# Patient Record
Sex: FEMALE | Race: AMERICAN INDIAN OR ALASKA NATIVE
[De-identification: names, ages, dates, MRNs, and addresses within clinical notes are randomized per-mention and may not be internally consistent; named-entity substitution may affect disease eponyms.]

---

## 2018-09-23 ENCOUNTER — HOSPITAL ENCOUNTER (OUTPATIENT)
Dept: HOSPITAL 42 - ED | Age: 58
Setting detail: OBSERVATION
LOS: 1 days | Discharge: HOME | End: 2018-09-24
Attending: INTERNAL MEDICINE | Admitting: INTERNAL MEDICINE
Payer: MEDICAID

## 2018-09-23 VITALS — BODY MASS INDEX: 26.6 KG/M2

## 2018-09-23 DIAGNOSIS — R55: ICD-10-CM

## 2018-09-23 DIAGNOSIS — E78.00: ICD-10-CM

## 2018-09-23 DIAGNOSIS — E87.6: ICD-10-CM

## 2018-09-23 DIAGNOSIS — J45.909: ICD-10-CM

## 2018-09-23 DIAGNOSIS — E78.5: ICD-10-CM

## 2018-09-23 DIAGNOSIS — I44.7: ICD-10-CM

## 2018-09-23 DIAGNOSIS — I10: ICD-10-CM

## 2018-09-23 DIAGNOSIS — E86.0: Primary | ICD-10-CM

## 2018-09-23 LAB
ALBUMIN SERPL-MCNC: 4.1 G/DL (ref 3–4.8)
ALBUMIN/GLOB SERPL: 1.2 {RATIO} (ref 1.1–1.8)
ALT SERPL-CCNC: 22 U/L (ref 7–56)
APTT BLD: 25.7 SECONDS (ref 25.1–36.5)
AST SERPL-CCNC: 25 U/L (ref 14–36)
BUN SERPL-MCNC: 19 MG/DL (ref 7–21)
CALCIUM SERPL-MCNC: 9.6 MG/DL (ref 8.4–10.5)
ERYTHROCYTE [DISTWIDTH] IN BLOOD BY AUTOMATED COUNT: 15.2 % (ref 11.5–14.5)
GFR NON-AFRICAN AMERICAN: 57
HGB BLD-MCNC: 10.9 G/DL (ref 12–16)
INR PPP: 0.95
MCH RBC QN AUTO: 25.9 PG (ref 25–35)
MCHC RBC AUTO-ENTMCNC: 32 G/DL (ref 31–37)
MCV RBC AUTO: 81 FL (ref 80–105)
PLATELET # BLD: 247 10^3/UL (ref 120–450)
PMV BLD AUTO: 9.8 FL (ref 7–11)
PROTHROMBIN TIME: 10.8 SECONDS (ref 9.4–12.5)
RBC # BLD AUTO: 4.21 10^6/UL (ref 3.5–6.1)
TROPONIN I SERPL-MCNC: < 0.01 NG/ML
WBC # BLD AUTO: 6.7 10^3/UL (ref 4.5–11)

## 2018-09-23 PROCEDURE — 85027 COMPLETE CBC AUTOMATED: CPT

## 2018-09-23 PROCEDURE — 85730 THROMBOPLASTIN TIME PARTIAL: CPT

## 2018-09-23 PROCEDURE — 70450 CT HEAD/BRAIN W/O DYE: CPT

## 2018-09-23 PROCEDURE — 84484 ASSAY OF TROPONIN QUANT: CPT

## 2018-09-23 PROCEDURE — 71045 X-RAY EXAM CHEST 1 VIEW: CPT

## 2018-09-23 PROCEDURE — 93005 ELECTROCARDIOGRAM TRACING: CPT

## 2018-09-23 PROCEDURE — 80345 DRUG SCREENING BARBITURATES: CPT

## 2018-09-23 PROCEDURE — 83615 LACTATE (LD) (LDH) ENZYME: CPT

## 2018-09-23 PROCEDURE — 80358 DRUG SCREENING METHADONE: CPT

## 2018-09-23 PROCEDURE — 96374 THER/PROPH/DIAG INJ IV PUSH: CPT

## 2018-09-23 PROCEDURE — 80324 DRUG SCREEN AMPHETAMINES 1/2: CPT

## 2018-09-23 PROCEDURE — 84443 ASSAY THYROID STIM HORMONE: CPT

## 2018-09-23 PROCEDURE — 97161 PT EVAL LOW COMPLEX 20 MIN: CPT

## 2018-09-23 PROCEDURE — 82550 ASSAY OF CK (CPK): CPT

## 2018-09-23 PROCEDURE — 80353 DRUG SCREENING COCAINE: CPT

## 2018-09-23 PROCEDURE — 85025 COMPLETE CBC W/AUTO DIFF WBC: CPT

## 2018-09-23 PROCEDURE — 84100 ASSAY OF PHOSPHORUS: CPT

## 2018-09-23 PROCEDURE — 80349 CANNABINOIDS NATURAL: CPT

## 2018-09-23 PROCEDURE — 82948 REAGENT STRIP/BLOOD GLUCOSE: CPT

## 2018-09-23 PROCEDURE — 80346 BENZODIAZEPINES1-12: CPT

## 2018-09-23 PROCEDURE — 96372 THER/PROPH/DIAG INJ SC/IM: CPT

## 2018-09-23 PROCEDURE — 83992 ASSAY FOR PHENCYCLIDINE: CPT

## 2018-09-23 PROCEDURE — 80053 COMPREHEN METABOLIC PANEL: CPT

## 2018-09-23 PROCEDURE — 80361 OPIATES 1 OR MORE: CPT

## 2018-09-23 PROCEDURE — 85610 PROTHROMBIN TIME: CPT

## 2018-09-23 PROCEDURE — 83735 ASSAY OF MAGNESIUM: CPT

## 2018-09-23 PROCEDURE — 99285 EMERGENCY DEPT VISIT HI MDM: CPT

## 2018-09-24 VITALS
HEART RATE: 70 BPM | RESPIRATION RATE: 20 BRPM | SYSTOLIC BLOOD PRESSURE: 119 MMHG | DIASTOLIC BLOOD PRESSURE: 78 MMHG | TEMPERATURE: 98.3 F

## 2018-09-24 VITALS — OXYGEN SATURATION: 98 %

## 2018-09-24 LAB
ALBUMIN SERPL-MCNC: 3.9 G/DL (ref 3–4.8)
ALBUMIN/GLOB SERPL: 1.1 {RATIO} (ref 1.1–1.8)
ALT SERPL-CCNC: 25 U/L (ref 7–56)
AST SERPL-CCNC: 23 U/L (ref 14–36)
BASOPHILS # BLD AUTO: 0.01 K/MM3 (ref 0–2)
BASOPHILS NFR BLD: 0.1 % (ref 0–3)
BUN SERPL-MCNC: 18 MG/DL (ref 7–21)
CALCIUM SERPL-MCNC: 9.5 MG/DL (ref 8.4–10.5)
EOSINOPHIL # BLD: 0.3 10*3/UL (ref 0–0.7)
EOSINOPHIL NFR BLD: 4.2 % (ref 1.5–5)
ERYTHROCYTE [DISTWIDTH] IN BLOOD BY AUTOMATED COUNT: 15.2 % (ref 11.5–14.5)
GFR NON-AFRICAN AMERICAN: 51
GRANULOCYTES # BLD: 3.43 10*3/UL (ref 1.4–6.5)
GRANULOCYTES NFR BLD: 50.2 % (ref 50–68)
HGB BLD-MCNC: 10.9 G/DL (ref 12–16)
LYMPHOCYTES # BLD: 2.6 10*3/UL (ref 1.2–3.4)
LYMPHOCYTES NFR BLD AUTO: 37.6 % (ref 22–35)
MCH RBC QN AUTO: 25.6 PG (ref 25–35)
MCHC RBC AUTO-ENTMCNC: 31.8 G/DL (ref 31–37)
MCV RBC AUTO: 80.7 FL (ref 80–105)
MONOCYTES # BLD AUTO: 0.5 10*3/UL (ref 0.1–0.6)
MONOCYTES NFR BLD: 7.9 % (ref 1–6)
PLATELET # BLD: 234 10^3/UL (ref 120–450)
PMV BLD AUTO: 9.3 FL (ref 7–11)
RBC # BLD AUTO: 4.25 10^6/UL (ref 3.5–6.1)
WBC # BLD AUTO: 6.8 10^3/UL (ref 4.5–11)

## 2018-09-24 NOTE — CP.PCM.HP
<DelroyBryon - Last Filed: 09/24/18 01:39>





History of Present Illness





- History of Present Illness


History of Present Illness: 





HISTORY & PHYSICAL NOTE FOR HOSPITALIST TEAM


Bryon Potts D.O. PGY-1





CC: Dizziness/Passing out





58 y/o with pmhx of HTN, HLD, previous LBBB on ekg [place] [facility] [place] 

complaints of sensation of dizziness and "feelings of passing out" since 5pm. 

Pt reports she has had similar near-syncope episodes previously where she felt 

dizzy because she had been dehydrated and visited an ED, with relief from IV 

fluids. Pt reports that she felt lightheaded and dizzy that was relieved while 

laying down and worsened when walking and lasted about 1/2 hour. She reports 

only consuming 1 cup of coffee during the day, without breakfast or any other 

liquids. She denied any vomiting or diarrhea. She reports she did not pass out 

or fall, or have any seizure like activity. She reports that she had previously 

seen a cardiologist several years ago and underwent a stress test but doesn't 

recall the reason for the test. Upon interview, she reports improvement in her 

symptoms and denies fevers, chills, chest pain, palpitations, headache, 

dizziness, fatigue, weakness, constipation, diarrhea, dysuria. 





PMH: HTN, HLD, Asthma


All: Peanuts


PSH: Denies


SH: denies cigarette/alcohol use. 


FH: Mother: DM. Father: "blood vessel burst in head"


Meds: See MAR. Reviewed. 


PMD: Dr. Toure


Cardiologist: Denies seeing





Present on Admission





- Present on Admission


Any Indicators Present on Admission: No





Review of Systems





- Review of Systems


Review of Systems: 





as per HPI





Past Patient History





- Infectious Disease


Hx of Infectious Diseases: None





- Past Social History


Smoking Status: Never Smoked





- CARDIAC


Hx Hypertension: Yes





- PULMONARY


Hx Asthma: Yes





- PSYCHIATRIC


Hx Substance Use: No





- ANESTHESIA


Hx Anesthesia: No


Hx Anesthesia Reactions: No


Hx Malignant Hyperthermia: No





Meds


Allergies/Adverse Reactions: 


 Allergies











Allergy/AdvReac Type Severity Reaction Status Date / Time


 


peanuts Allergy  RASH Uncoded 09/23/18 21:20














Physical Exam





- Constitutional


Appears: Well, Non-toxic, No Acute Distress





- Head Exam


Head Exam: ATRAUMATIC, NORMAL INSPECTION





- Eye Exam


Eye Exam: EOMI, Normal appearance





- ENT Exam


ENT Exam: Mucous Membranes Dry, Normal Exam





- Neck Exam


Neck exam: Positive for: Normal Inspection





- Respiratory Exam


Respiratory Exam: Clear to Auscultation Bilateral, NORMAL BREATHING PATTERN





- Cardiovascular Exam


Cardiovascular Exam: REGULAR RHYTHM, +S1, +S2





- GI/Abdominal Exam


GI & Abdominal Exam: Normal Bowel Sounds, Soft.  absent: Tenderness





- Back Exam


Back exam: NORMAL INSPECTION





- Neurological Exam


Neurological exam: Alert, CN II-XII Intact, Oriented x3





- Psychiatric Exam


Psychiatric exam: Normal Affect, Normal Mood





- Skin


Skin Exam: Dry, Intact, Warm





Results





- Vital Signs


Recent Vital Signs: 





 Last Vital Signs











Temp  98.9 F   09/23/18 21:20


 


Pulse  76   09/23/18 21:20


 


Resp  18   09/23/18 21:20


 


BP  138/85   09/23/18 21:20


 


Pulse Ox  100   09/23/18 21:20














- Labs


Result Diagrams: 


 09/23/18 22:43





 09/23/18 22:43





Assessment & Plan





- Assessment and Plan (Free Text)


Assessment: 





58 y/o F with pmhx of HTN, HLD, previous LBBB on EKG admitted for near-syncope. 

In ED, pt was found to have hypokalemia, and EKG showed LBBB that was unchanged 

from previous EKG. She was started on NS and pt reported improvement in 

symptoms. 





Plan: 





Pre-syncope


Likely secondary to hypokalemia and dehydration. 


Start NS @ 100ml/hr. Monitor CMP in am


Orthostatic vitals


f/u urine drug screen


f/u TSH





Hypokalemia


Given KCl 40mEq in ED. Continue KCL 20mEQ


f/u CMP in am





Left bundle branch 


Unchanged from previous EKG


Monitor on telemetry 





HTN


Hold home lisinopril/HCTZ





HLD


Continue home pravastatin





Diet/GI ppx/DVT ppx: HHD/Lovenox/Protonix





Case seen examined and discussed with attending physician, Dr. Pacheco. Further 

recs per him





<Charisma Pacheco - Last Filed: 09/24/18 06:06>





Results





- Vital Signs


Recent Vital Signs: 





 Last Vital Signs











Temp  98 F   09/24/18 04:20


 


Pulse  72   09/24/18 04:20


 


Resp  18   09/24/18 04:20


 


BP  123/77   09/24/18 04:20


 


Pulse Ox  100   09/24/18 04:20














- Labs


Result Diagrams: 


 09/23/18 22:43





 09/23/18 22:43


Labs: 





 Laboratory Results - last 24 hr











  09/24/18





  04:40


 


Urine Opiates Screen  Negative














Attending/Attestation





- Attestation


I have personally seen and examined this patient.: Yes


I have fully participated in the care of the patient.: Yes


I have reviewed all pertinent clinical information: Yes

## 2018-09-24 NOTE — CP.PCM.DIS
Provider





- Provider


Date of Admission: 


09/24/18 09:56





Attending physician: 


Janis Feliciano MD





Primary care physician: 





[facility], VALERIANO Toure


Time Spent in preparation of Discharge (in minutes): 35





Hospital Course





- Lab Results


Lab Results: 


 Most Recent Lab Values











WBC  6.8 10^3/ul (4.5-11.0)   09/24/18  06:40    


 


RBC  4.25 10^6/uL (3.5-6.1)   09/24/18  06:40    


 


Hgb  10.9 g/dL (12.0-16.0)  L  09/24/18  06:40    


 


Hct  34.3 % (36.0-48.0)  L  09/24/18  06:40    


 


MCV  80.7 fl (80.0-105.0)   09/24/18  06:40    


 


MCH  25.6 pg (25.0-35.0)   09/24/18  06:40    


 


MCHC  31.8 g/dl (31.0-37.0)   09/24/18  06:40    


 


RDW  15.2 % (11.5-14.5)  H  09/24/18  06:40    


 


Plt Count  234 10^3/uL (120.0-450.0)   09/24/18  06:40    


 


MPV  9.3 fl (7.0-11.0)   09/24/18  06:40    


 


Gran %  50.2 % (50.0-68.0)   09/24/18  06:40    


 


Lymph % (Auto)  37.6 % (22.0-35.0)  H  09/24/18  06:40    


 


Mono % (Auto)  7.9 % (1.0-6.0)  H  09/24/18  06:40    


 


Eos % (Auto)  4.2 % (1.5-5.0)   09/24/18  06:40    


 


Baso % (Auto)  0.1 % (0.0-3.0)   09/24/18  06:40    


 


Gran #  3.43  (1.4-6.5)   09/24/18  06:40    


 


Lymph # (Auto)  2.6  (1.2-3.4)   09/24/18  06:40    


 


Mono # (Auto)  0.5  (0.1-0.6)   09/24/18  06:40    


 


Eos # (Auto)  0.3  (0.0-0.7)   09/24/18  06:40    


 


Baso # (Auto)  0.01 K/mm3 (0.0-2.0)   09/24/18  06:40    


 


PT  10.8 SECONDS (9.4-12.5)   09/23/18  22:43    


 


INR  0.95   09/23/18  22:43    


 


APTT  25.7 Seconds (25.1-36.5)   09/23/18  22:43    


 


Sodium  143 mmol/L (132-148)   09/24/18  06:40    


 


Potassium  3.7 mmol/L (3.6-5.0)   09/24/18  06:40    


 


Chloride  106 mmol/L ()   09/24/18  06:40    


 


Carbon Dioxide  29 mmol/L (21-33)   09/24/18  06:40    


 


Anion Gap  11  (10-20)   09/24/18  06:40    


 


BUN  18 mg/dL (7-21)   09/24/18  06:40    


 


Creatinine  1.1 mg/dl (0.7-1.2)   09/24/18  06:40    


 


Est GFR ( Amer)  > 60   09/24/18  06:40    


 


Est GFR (Non-Af Amer)  51   09/24/18  06:40    


 


POC Glucose (mg/dL)  101 mg/dL ()   09/23/18  21:55    


 


Random Glucose  98 mg/dL ()   09/24/18  06:40    


 


Calcium  9.5 mg/dL (8.4-10.5)   09/24/18  06:40    


 


Phosphorus  4.0 mg/dL (2.5-4.5)   09/24/18  06:40    


 


Magnesium  2.2 mg/dL (1.7-2.2)   09/24/18  06:40    


 


Total Bilirubin  0.3 mg/dL (0.2-1.3)   09/24/18  06:40    


 


AST  23 U/L (14-36)   09/24/18  06:40    


 


ALT  25 U/L (7-56)   09/24/18  06:40    


 


Alkaline Phosphatase  87 U/L ()   09/24/18  06:40    


 


Lactate Dehydrogenase  406 U/L (333-699)   09/23/18  22:43    


 


Total Creatine Kinase  150 U/L ()   09/23/18  22:43    


 


Troponin I  < 0.01 ng/mL  09/23/18  22:43    


 


Total Protein  7.5 g/dL (5.8-8.3)   09/24/18  06:40    


 


Albumin  3.9 g/dL (3.0-4.8)   09/24/18  06:40    


 


Globulin  3.5 gm/dL  09/24/18  06:40    


 


Albumin/Globulin Ratio  1.1  (1.1-1.8)   09/24/18  06:40    


 


TSH 3rd Generation  1.51 mIU/mL (0.46-4.68)   09/24/18  06:40    


 


Urine Opiates Screen  Negative  (NEGATIVE)   09/24/18  04:40    


 


Urine Methadone Screen  Negative  (NEGATIVE)   09/24/18  04:40    


 


Ur Barbiturates Screen  Negative  (NEGATIVE)   09/24/18  04:40    


 


Ur Phencyclidine Scrn  Negative  (NEGATIVE)   09/24/18  04:40    


 


Ur Amphetamines Screen  Negative  (NEGATIVE)   09/24/18  04:40    


 


U Benzodiazepines Scrn  Negative  (NEGATIVE)   09/24/18  04:40    


 


U Oth Cocaine Metabols  Negative  (NEGATIVE)   09/24/18  04:40    


 


U Cannabinoids Screen  Negative  (NEGATIVE)   09/24/18  04:40    














- Hospital Course


Hospital Course: 





56 y/o F with past medical history of HTN, HLD, previous LBBB on EKG admitted 

for near-syncope. In ED, patient was found to have hypokalemia, and EKG showed 

LBBB that was unchanged from previous EKG and she was placed on telemetry. Her 

symptoms were likely secondary to hypokalemia and dehydration.She was started 

on IV fluids potassium was repleted. Patient reported improvement in symptoms. 

Patient was on lisinopril/HCTZ for her HTN which potentially cause hypokalemia. 

Both meds were held during hospitalization and patient was advised to discuss 

with her PMD for further management of her HTN. Patient was advice to stay well 

hydrated aand follow a heart healthy diet and exercise.











On discharge:





- Please follow up with your primary care provider at [facility], 

VALERIANO Toure, within 3-5 days of discharge for post hospitalization follow up. 


- Please discuss that your potassium was found to be low on this admission as 

this can be a side effect of your blood pressure medication.


- Please continue taking all medications as prescribed until you can discuss 

any changes with your primary care provider. Please stay well hydrated and eat 

all   your meals.


- Should your symptoms return, please return to the emergency room.





Discharge Exam





- Head Exam


Head Exam: ATRAUMATIC, NORMAL INSPECTION





- Eye Exam


Eye Exam: EOMI, Normal appearance, PERRL


Pupil Exam: NORMAL ACCOMODATION, PERRL





- ENT Exam


ENT Exam: Normal Exam, Normal Oropharynx





- Neck Exam


Neck exam: Full Rom, Normal Inspection





- Respiratory Exam


Respiratory Exam: NORMAL BREATHING PATTERN





- Cardiovascular Exam


Cardiovascular Exam: REGULAR RHYTHM, +S1, +S2





- GI/Abdominal Exam


GI & Abdominal Exam: Normal Bowel Sounds





- Extremities Exam


Extremities exam: full ROM, normal capillary refill, pedal pulses present





- Back Exam


Back exam: FULL ROM





- Neurological Exam


Neurological exam: Alert, CN II-XII Intact, Normal Gait, Oriented x3, Reflexes 

Normal





- Psychiatric Exam


Psychiatric exam: Normal Affect, Normal Mood





- Skin


Skin Exam: Dry, Intact, Normal Color, Warm





Discharge Plan





- Follow Up Plan


Condition: STABLE


Disposition: HOME/ ROUTINE


Instructions:  Hypokalemia (DC), Near Fainting (DC)


Additional Instructions: 


Please follow up with your primary care provider at [facility], 

VALERIANO Toure, within 3-5 days of discharge for post hospitalization follow up. 

Please discuss that your potassium was found to be low on this admission as 

this can be a side effect of your blood pressure medication.





Please continue taking all medications as prescribed until you can discuss any 

changes with your primary care provider. Please stay well hydrated and eat all 

your meals.





Should your symptoms return, please return to the emergency room.


Referrals: 


Aileen Toure APN [Advanced Practice Nurse] -

## 2018-09-24 NOTE — RAD
Date of service: 



09/23/2018



PROCEDURE:  CHEST RADIOGRAPH, 1 VIEW



HISTORY:

near syncope



COMPARISON:

02/20/2017



FINDINGS:



LUNGS:

Clear.



PLEURA:

No pneumothorax or pleural fluid seen.



CARDIOVASCULAR:

 No radiographic findings to suggest acute or significant 

cardiovascular disease.



OSSEOUS STRUCTURES:

No significant abnormalities.



VISUALIZED UPPER ABDOMEN:

Normal.



OTHER FINDINGS:

None. 



IMPRESSION:

No active disease. No acute/significant interval changes.

## 2018-09-24 NOTE — CARD
--------------- APPROVED REPORT --------------





Date of service: 09/23/2018



EKG Measurement

Heart [id number]

WV [id number]

[id number]

[id number]

[id number]



<Conclusion>

Normal sinus rhythm

Left axis deviation

Left bundle branch block

Abnormal ECG

## 2018-09-24 NOTE — CT
Date of service: 



09/23/2018



PROCEDURE:  CT HEAD WITHOUT CONTRAST.



HISTORY:

near syncope



COMPARISON:

None available.



TECHNIQUE:

Axial computed tomography images were obtained through the head/brain 

without intravenous contrast.  



Supplemental Coronal and Sagittal projectections created and reviewed.



Radiation dose:



Total exam DLP = 948.77 mGy-cm.



This CT exam was performed using one or more of the following dose 

reduction techniques: Automated exposure control, adjustment of the 

mA and/or kV according to patient size, and/or use of iterative 

reconstruction technique.



FINDINGS:



HEMORRHAGE:

No intracranial hemorrhage. 



BRAIN:

No mass effect or edema.  No atrophy or chronic microvascular 

ischemic changes.



VENTRICLES:

Unremarkable. No hydrocephalus. 



CALVARIUM:

Unremarkable.



PARANASAL SINUSES:

Unremarkable as visualized. No significant inflammatory changes.



MASTOID AIR CELLS:

Unremarkable as visualized. No inflammatory changes.



OTHER FINDINGS:

None.



IMPRESSION:

No acute intracranial abnormalities. No significant findings to 

account for the clinical presentation.  



________________________________________________



[facility] results (preliminary interpretation) provided by [facility].



Procedure Completed: 22:07



Preliminary ([facility]) Report: Dictated and Authenticated: 23:13



Final Interpretation: 08:02  September 24, 2018.